# Patient Record
Sex: MALE | Race: WHITE | NOT HISPANIC OR LATINO | Employment: UNEMPLOYED | URBAN - METROPOLITAN AREA
[De-identification: names, ages, dates, MRNs, and addresses within clinical notes are randomized per-mention and may not be internally consistent; named-entity substitution may affect disease eponyms.]

---
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
## 2023-10-22 ENCOUNTER — APPOINTMENT (OUTPATIENT)
Dept: GENERAL RADIOLOGY | Facility: HOSPITAL | Age: 18
End: 2023-10-22
Payer: COMMERCIAL

## 2023-10-22 ENCOUNTER — HOSPITAL ENCOUNTER (EMERGENCY)
Facility: HOSPITAL | Age: 18
Discharge: HOME | End: 2023-10-22
Attending: EMERGENCY MEDICINE | Admitting: EMERGENCY MEDICINE
Payer: COMMERCIAL

## 2023-10-22 VITALS
HEART RATE: 72 BPM | TEMPERATURE: 98.1 F | BODY MASS INDEX: 19.64 KG/M2 | DIASTOLIC BLOOD PRESSURE: 76 MMHG | WEIGHT: 145 LBS | OXYGEN SATURATION: 100 % | SYSTOLIC BLOOD PRESSURE: 118 MMHG | HEIGHT: 72 IN | RESPIRATION RATE: 18 BRPM

## 2023-10-22 DIAGNOSIS — S61.258A DOG BITE OF INDEX FINGER, INITIAL ENCOUNTER: Primary | ICD-10-CM
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
## 2023-10-22 DIAGNOSIS — S61.258A DOG BITE OF INDEX FINGER, INITIAL ENCOUNTER: ICD-10-CM

## 2023-10-22 PROCEDURE — 90471 IMMUNIZATION ADMIN: CPT | Performed by: PHYSICIAN ASSISTANT

## 2023-10-22 PROCEDURE — 25010000002 TETANUS-DIPHTH-ACELL PERTUSSIS 5-2.5-18.5 LF-MCG/0.5 SUSPENSION PREFILLED SYRINGE: Performed by: PHYSICIAN ASSISTANT

## 2023-10-22 PROCEDURE — 90715 TDAP VACCINE 7 YRS/> IM: CPT | Performed by: PHYSICIAN ASSISTANT

## 2023-10-22 PROCEDURE — 99283 EMERGENCY DEPT VISIT LOW MDM: CPT

## 2023-10-22 PROCEDURE — 73140 X-RAY EXAM OF FINGER(S): CPT

## 2023-10-22 RX ORDER — AMOXICILLIN AND CLAVULANATE POTASSIUM 875; 125 MG/1; MG/1
1 TABLET, FILM COATED ORAL 2 TIMES DAILY
Qty: 14 TABLET | Refills: 0 | Status: SHIPPED | OUTPATIENT
Start: 2023-10-22

## 2023-10-22 RX ORDER — AMOXICILLIN AND CLAVULANATE POTASSIUM 875; 125 MG/1; MG/1
1 TABLET, FILM COATED ORAL ONCE
Status: COMPLETED | OUTPATIENT
Start: 2023-10-22 | End: 2023-10-22

## 2023-10-22 RX ADMIN — TETANUS TOXOID, REDUCED DIPHTHERIA TOXOID AND ACELLULAR PERTUSSIS VACCINE, ADSORBED 0.5 ML: 5; 2.5; 8; 8; 2.5 SUSPENSION INTRAMUSCULAR at 23:46

## 2023-10-22 RX ADMIN — AMOXICILLIN AND CLAVULANATE POTASSIUM 1 TABLET: 875; 125 TABLET, FILM COATED ORAL at 23:46

## 2023-10-22 NOTE — Clinical Note
[facility] EMERGENCY DEPARTMENT  [address]  [facility] [address]  Phone: [phone]    Michael Brand was seen and treated in our emergency department on 10/22/2023.  He may return to work on 10/24/2023.         Thank you for choosing [facility].    Ashley Mares, JOSUE

## 2023-10-23 NOTE — DISCHARGE INSTRUCTIONS
X-rays of the right and left second fingers revealed no acute bony abnormality.  We cleaned wounds with wound wash and saline.  We updated patient's tetanus status.  Rx for Augmentin 875 mg twice daily x7 days.  Animal in question is in quarantine by the [employer] and they have been given all information about the dog bite.  There is no need for any closure of the wounds.  Keep wounds clean with soap and water and open to the air is much as possible.  Tylenol/ibuprofen every 4-6 hours as needed for pain.  We gave follow-up info for patient connection so patient can establish care with PCP in the [place] area for recheck.  We also gave follow-up phone number for hand specialist, Dr. Boss, and recommend close recheck with hand specialist, as well.  Return to the ER if worsening symptoms.  Return sooner to the ER if any worsening symptoms of redness, warmth, or worsening swelling.

## 2023-10-23 NOTE — ED PROVIDER NOTES
Subjective   History of Present Illness  This is a healthy 18-year-old male that presents the ER with dog bite to the right second phalanx and left second phalanx that occurred 2 days ago.  Patient and significant other stated that they have had a stray dog in their neighborhood.  It is approximately 15 pounds and described as a mixed breed.  The dog looked hungry and it was colder temperatures outside, so patient brought the dog to his home and was trying to give him a bath.  The dog did fine during the bath and patient went to give him a treat.  He uncovered the dog with a blanket, and the dog snapped at his finger and bit him.  He had small puncture to the proximal aspect of the right second phalanx and now has some mild swelling and painful range of motion.  There is no significant confluent redness or warmth.  Patient also has punctured to the distal left second phalanx.  There is no significant joint swelling on the left second finger.  Patient's tetanus is not up-to-date.  They took the dog to the [facility] and he is currently in quarantine.  They completed full animal bite form with [employer].  Patient presented to the ER for antibiotics and tetanus status to be updated.  He reports throbbing and aching to the right second phalanx but no numbness or tingling.  He has been cleaning the wounds with soap and water.  No other concerns at this time.    History provided by:  Patient and significant other  Animal Bite  Contact animal:  Dog  Location:  Finger  Finger injury location:  L index finger and R index finger  Time since incident:  2 days  Pain details:     Quality:  Aching (throbbing)    Severity:  Mild    Timing:  Constant    Progression:  Unchanged  Incident location:  Home  Provoked: unprovoked    Notifications:  Animal control  Animal's rabies vaccination status:  Unknown  Animal in possession: yes (Pt is in quarantine at the [facility] here in [place]. Full report has been made  regarding animal bite)    Tetanus status:  Out of date  Relieved by:  Nothing  Exacerbated by: Range of motion right second phalanx.  Ineffective treatments: cleaning with soap and water.  Associated symptoms: swelling    Associated symptoms: no fever and no numbness        Review of Systems   Constitutional: Negative.  Negative for activity change, appetite change, chills, diaphoresis, fatigue and fever.   Gastrointestinal: Negative.  Negative for nausea and vomiting.   Musculoskeletal:  Positive for arthralgias (Right second phalanx and left second phalanx.) and joint swelling (Mild swelling to proximal right second phalanx).   Skin:  Positive for wound (Puncture wound from dog bite to the right proximal second phalanx and left distal second phalanx.). Negative for color change (I do not appreciate any significant redness or warmth on physical exam to the right second phalanx.).   Neurological:  Negative for numbness.   All other systems reviewed and are negative.      No past medical history on file.    No Known Allergies    No past surgical history on file.    Family History   Problem Relation Age of Onset    Seizures Mother     Heart disease Mother     Hypertension Father     Peripheral vascular disease Father     Deep vein thrombosis Father        Social History     Socioeconomic History    Marital status: Single   Tobacco Use    Smoking status: Passive Smoke Exposure - Never Smoker   Substance and Sexual Activity    Alcohol use: No    Drug use: No    Sexual activity: Defer           Objective   Physical Exam  Vitals and nursing note reviewed.   Constitutional:       Appearance: Normal appearance.   HENT:      Head: Normocephalic and atraumatic.      Nose: Nose normal.      Mouth/Throat:      Mouth: Mucous membranes are moist.   Eyes:      Extraocular Movements: Extraocular movements intact.      Conjunctiva/sclera: Conjunctivae normal.      Pupils: Pupils are equal, round, and reactive to light.    Cardiovascular:      Rate and Rhythm: Normal rate and regular rhythm.      Pulses: Normal pulses.           Radial pulses are 2+ on the right side and 2+ on the left side.      Heart sounds: Normal heart sounds.      Comments: Strong right and left radial and ulnar pulses and brisk capillary refill.  Pulmonary:      Effort: Pulmonary effort is normal.   Musculoskeletal:         General: Normal range of motion.      Cervical back: Neck supple.      Comments: Patient has mild localized swelling to the right second proximal phalanx with puncture wound from dog bite.  I do not appreciate any confluent erythema or warmth.  Patient has full active range of motion of the right second phalanx.  No concern for cellulitis at this time.  No necessity for closure of puncture wound.  Patient also has puncture wound to left second distal phalanx.  There is no soft tissue swelling appreciated and active, full range of motion of the left second phalanx.  See skin exam for details.  Joint exam is stable.  Patient has good strength of both right second phalanx and left second phalanx.  No concern for tendon injury.   Skin:     General: Skin is warm and dry.      Findings: Wound present. No bruising, ecchymosis or erythema.      Comments: Patient has puncture wound to right second proximal phalanx and distal left second phalanx.  There is mild soft tissue swelling, localized, to the right second proximal phalanx but no confluent erythema or warmth.  There is also a puncture to the left second distal phalanx.  No confluent erythema, warmth, or swelling to the left second phalanx.  No concern for significant cellulitis.   Neurological:      General: No focal deficit present.      Mental Status: He is alert.         Procedures           ED Course  ED Course as of 10/22/23 2223   Sun Oct 22, 2023   2041 Patient is afebrile.  He has puncture wounds to the right second proximal phalanx and left second distal phalanx.  He has some  localized swelling to the proximal right second phalanx but no confluent erythema or warmth.  Patient has active range of motion of the right second phalanx and left second phalanx.  We updated patient's tetanus status.  The animal in question is in position by the [employer] and is in quarantine.  We washed patient's wounds with wound wash and saline.  I ordered x-rays of the right and left second phalanx to rule out any bony abnormality.  We will prescribe Augmentin 875 mg twice daily x7 days.  There is no concern for significant cellulitis and no need for closure of any wounds.  They are small punctures that are starting to heal.  Recommend close PCP follow-up for recheck.  Patient has full range of motion of bilateral second phalanges.  Return to the ER if any worsening symptoms.  Recommend close PCP follow-up for recheck.  I do not appreciate that patient has a PCP so we will give him phone number for patient connection.  I also will give patient info for hand specialist, Dr. Boss, for evaluation, as well. [FC]   2222 I personally reviewed x-rays of the right second phalanx and left second phalanx and there were no acute bony abnormalities.  They were also formally read by the radiologist.  Plan on discharge remains the same.  Recommend hand follow-up and to establish care with PCP for close recheck.  Tetanus status was updated and we will prescribe Augmentin.  Return to the ER if worsening symptoms. [FC]      ED Course User Index  [FC] Ashley Mares, JOSUE                No results found for this or any previous visit (from the past 24 hour(s)).  Note: In addition to lab results from this visit, the labs listed above may include labs taken at another facility or during a different encounter within the last 24 hours. Please correlate lab times with ED admission and discharge times for further clarification of the services performed during this visit.    XR Finger 2+ View Right   Final Result  "  Impression:   Normal         Electronically Signed: Edmar Cedeno MD     10/22/2023 9:50 PM EDT     Workstation ID: [id number]      XR Finger 2+ View Left   Final Result   Impression:   Normal         Electronically Signed: Edmar Cedeno MD     10/22/2023 9:49 PM EDT     Workstation ID: [id number]        Vitals:    10/22/23 1740 10/22/23 2202   BP: 121/65 112/69   BP Location: Left arm    Patient Position: Sitting    Pulse: 78 81   Resp: 18    Temp: 98.1 °F (36.7 °C)    TempSrc: Oral    SpO2: 98% 99%   Weight: 65.8 kg (145 lb)    Height: 182.9 cm (72\")      Medications   Tetanus-Diphth-Acell Pertussis (BOOSTRIX) injection 0.5 mL (has no administration in time range)   amoxicillin-clavulanate (AUGMENTIN) 875-125 MG per tablet 1 tablet (has no administration in time range)     ECG/EMG Results (last 24 hours)       ** No results found for the last 24 hours. **          No orders to display                                  Medical Decision Making  Problems Addressed:  Dog bite of index finger, initial encounter: complicated acute illness or injury    Amount and/or Complexity of Data Reviewed  Radiology: ordered.    Risk  Prescription drug management.        Final diagnoses:   Dog bite of index finger, initial encounter   Dog bite of index finger, initial encounter       ED Disposition  ED Disposition       ED Disposition   Discharge    Condition   Stable    Comment   --               PATIENT CONNECTION - [place] [address]  [phone]  Call in 1 day  Call tomorrow to establish care with PCP for close recheck    Leny Boss MD  [address]  [address]  [facility] [address]  [phone]    Call in 1 day  Call tomorrow for close recheck due to dog bite of fingers to ensure that tendons are okay.    [facility] EMERGENCY DEPARTMENT  [address]  [facility] [address] [phone]    If symptoms worsen         Medication List        New Prescriptions  "     amoxicillin-clavulanate 875-125 MG per tablet  Commonly known as: AUGMENTIN  Take 1 tablet by mouth 2 (Two) Times a Day.            Stop      brompheniramine-pseudoephedrine-DM 30-2-10 MG/5ML syrup               Where to Get Your Medications        These medications were sent to [facility]/pharmacy #[id number] - [place], [place] - [address] - [phone] [facility] [phone]   [address] [facility] [address]      Phone: [phone]   amoxicillin-clavulanate 875-125 MG per tablet            Ashley Mares PA-C  10/22/23 0079